# Patient Record
Sex: FEMALE | Race: OTHER
[De-identification: names, ages, dates, MRNs, and addresses within clinical notes are randomized per-mention and may not be internally consistent; named-entity substitution may affect disease eponyms.]

---

## 2018-10-13 ENCOUNTER — HOSPITAL ENCOUNTER (EMERGENCY)
Dept: HOSPITAL 62 - ER | Age: 28
Discharge: HOME | End: 2018-10-13
Payer: SELF-PAY

## 2018-10-13 VITALS — SYSTOLIC BLOOD PRESSURE: 111 MMHG | DIASTOLIC BLOOD PRESSURE: 57 MMHG

## 2018-10-13 DIAGNOSIS — K02.9: Primary | ICD-10-CM

## 2018-10-13 PROCEDURE — 99282 EMERGENCY DEPT VISIT SF MDM: CPT

## 2018-10-13 NOTE — ER DOCUMENT REPORT
HPI





- HPI


Patient complains to provider of: Toothache


Onset: Yesterday


Onset/Duration: Gradual


Pain Level: 5


Context: 





28-year-old female who has a decayed top second molar that started to hurt 

yesterday.  It cracked several months ago.  No facial swelling or fever.


Associated Symptoms: None


Exacerbated by: Denies


Relieved by: Denies


Similar symptoms previously: No


Recently seen / treated by doctor: No





- ROS


ROS below otherwise negative: Yes


Systems Reviewed and Negative: Yes All other systems reviewed and negative





Past Medical History





- General


Information source: Patient





- Social History


Smoking Status: Never Smoker


Lives with: Spouse/Significant other


Family History: Reviewed & Not Pertinent





- Medical History


Medical History: Negative


Surgical Hx: Negative





Vertical Provider Document





- CONSTITUTIONAL


Agree With Documented VS: Yes


Exam Limitations: No Limitations


General Appearance: No Apparent Distress





- INFECTION CONTROL


TRAVEL OUTSIDE OF THE [place] IN LAST 30 DAYS: No





- HEENT


Notes: 





Decayed second molar top right no gingival swelling or abscess





- NECK


Neck: Supple.  negative: Lymphadenopathy-Left, Lymphadenopathy-Right





- NEURO


Level of Consciousness: Awake





Course





- Vital Signs


Vital signs: 


 











Temp Pulse Resp BP Pulse Ox


 


 97.6 F   50 L  16   111/57 L  98 


 


 10/13/18 10:40  10/13/18 10:40  10/13/18 10:40  10/13/18 10:40  10/13/18 10:40














Discharge





- Discharge


Clinical Impression: 


 Top right dental pain and decay





Condition: Good


Disposition: HOME, SELF-CARE


Instructions:  [facility], Dentist, Penicillin V K ([facility]), 

Toothache ([facility]), Topical Lidocaine ([facility])


Additional Instructions: 


warm compress


See the dentist


Tylenol up to 4000 mg a day for pain


Motrin 600 mg up to 4 times a day for pain


Take the penicillin until is gone


Return to the emergency room any worsening of the symptoms if he cannot get 

into a dentist


Prescriptions: 


Ibuprofen [Motrin 600 mg Tablet] 600 mg PO Q8HP PRN #30 tablet


 PRN Reason: 


Penicillin V Potassium [Penicillin Vk 500 mg Tablet] 500 mg PO QID #40 tablet